# Patient Record
Sex: FEMALE
[De-identification: names, ages, dates, MRNs, and addresses within clinical notes are randomized per-mention and may not be internally consistent; named-entity substitution may affect disease eponyms.]

---

## 2022-11-07 ENCOUNTER — RX ONLY (RX ONLY)
Age: 52
End: 2022-11-07

## 2022-11-07 RX ORDER — FLUCONAZOLE 150 MG/1
TABLET ORAL
Qty: 2 | Refills: 0

## 2022-11-07 RX ORDER — FLUCONAZOLE 150 MG/1
TABLET ORAL
Qty: 2 | Refills: 0 | Status: CANCELLED
Stop reason: SDUPTHER

## 2022-11-11 ENCOUNTER — APPOINTMENT (OUTPATIENT)
Dept: URBAN - METROPOLITAN AREA CLINIC 210 | Age: 52
Setting detail: DERMATOLOGY
End: 2022-11-11

## 2022-11-11 DIAGNOSIS — Z79.899 OTHER LONG TERM (CURRENT) DRUG THERAPY: ICD-10-CM

## 2022-11-11 PROCEDURE — OTHER ORDER TESTS: OTHER

## 2022-11-11 NOTE — PROCEDURE: ORDER TESTS
Clinical Notes (To The Lab): Primary Care MD Christine Ann ordered blood work, lab order included with ICD 10 codes
Billing Type: Third-Party Bill
Expected Date Of Service: 11/11/2022
Bill For Surgical Tray: no
Performing Laboratory: -[id number]
Lab Facility: 0

## 2022-11-11 NOTE — PROCEDURE: ORDER TESTS
Bill For Surgical Tray: no
Expected Date Of Service: 11/11/2022
Clinical Notes (To The Lab): Ordered by Primary Care MD Dr Christine Ann, lab orders included ICD-10 codes, E03.9, R73.03, E78.2
Billing Type: Third-Party Bill
Performing Laboratory: -[id number]

## 2022-11-21 ENCOUNTER — RX ONLY (RX ONLY)
Age: 52
End: 2022-11-21

## 2022-11-21 RX ORDER — DICLOFENAC SODIUM 30 MG/G
GEL TOPICAL
Qty: 100 | Refills: 1 | Status: CANCELLED

## 2022-11-21 RX ORDER — DICLOFENAC SODIUM 30 MG/G
GEL TOPICAL
Qty: 100 | Refills: 1 | Status: ERX

## 2022-11-21 RX ORDER — PREDNISONE 10 MG/1
TABLET ORAL
Qty: 15 | Refills: 0 | Status: ERX

## 2023-01-25 ENCOUNTER — APPOINTMENT (OUTPATIENT)
Dept: URBAN - METROPOLITAN AREA CLINIC 210 | Age: 53
Setting detail: DERMATOLOGY
End: 2023-01-30

## 2023-01-25 DIAGNOSIS — L98419 CHRONIC ULCER OF OTHER SPECIFIED SITES: ICD-10-CM

## 2023-01-25 DIAGNOSIS — L98429 CHRONIC ULCER OF OTHER SPECIFIED SITES: ICD-10-CM

## 2023-01-25 PROBLEM — L98.411 NON-PRESSURE CHRONIC ULCER OF BUTTOCK LIMITED TO BREAKDOWN OF SKIN: Status: ACTIVE | Noted: 2023-01-25

## 2023-01-25 PROCEDURE — OTHER ORDER TESTS: OTHER

## 2023-01-25 ASSESSMENT — LOCATION ZONE DERM: LOCATION ZONE: TRUNK

## 2023-01-25 ASSESSMENT — LOCATION DETAILED DESCRIPTION DERM: LOCATION DETAILED: LEFT BUTTOCK

## 2023-01-25 ASSESSMENT — LOCATION SIMPLE DESCRIPTION DERM: LOCATION SIMPLE: LEFT BUTTOCK

## 2023-01-25 NOTE — PROCEDURE: ORDER TESTS
Performing Laboratory: -[id number]
Expected Date Of Service: 01/25/2023
Billing Type: Third-Party Bill
Bill For Surgical Tray: no

## 2023-01-30 ENCOUNTER — RX ONLY (RX ONLY)
Age: 53
End: 2023-01-30

## 2023-01-30 RX ORDER — CIPROFLOXACIN 500 MG/1
TABLET, FILM COATED ORAL
Qty: 28 | Refills: 0 | Status: ERX | COMMUNITY
Start: 2023-01-30

## 2023-02-22 ENCOUNTER — RX ONLY (RX ONLY)
Age: 53
End: 2023-02-22

## 2023-02-22 RX ORDER — SULFAMETHOXAZOLE AND TRIMETHOPRIM 800; 160 MG/1; MG/1
TABLET ORAL
Qty: 20 | Refills: 0 | Status: ERX | COMMUNITY
Start: 2023-02-22

## 2023-03-29 ENCOUNTER — APPOINTMENT (OUTPATIENT)
Dept: URBAN - METROPOLITAN AREA CLINIC 210 | Age: 53
Setting detail: DERMATOLOGY
End: 2023-04-02

## 2023-03-29 DIAGNOSIS — D485 NEOPLASM OF UNCERTAIN BEHAVIOR OF SKIN: ICD-10-CM

## 2023-03-29 PROBLEM — D48.5 NEOPLASM OF UNCERTAIN BEHAVIOR OF SKIN: Status: ACTIVE | Noted: 2023-03-29

## 2023-03-29 PROCEDURE — OTHER NO CHARGE VISIT: OTHER

## 2023-03-29 PROCEDURE — OTHER EXCISION: OTHER

## 2023-03-29 ASSESSMENT — LOCATION DETAILED DESCRIPTION DERM: LOCATION DETAILED: LEFT BUTTOCK

## 2023-03-29 ASSESSMENT — LOCATION SIMPLE DESCRIPTION DERM: LOCATION SIMPLE: LEFT BUTTOCK

## 2023-03-29 ASSESSMENT — LOCATION ZONE DERM: LOCATION ZONE: TRUNK

## 2023-03-29 NOTE — PROCEDURE: EXCISION
Path Notes (To The Dermatopathologist): Please check margins.\\nPatient is an employee please discount charges, thank you

## 2023-03-29 NOTE — PROCEDURE: EXCISION
(4) excellent Mastoid Interpolation Flap Text: A decision was made to reconstruct the defect utilizing an interpolation axial flap and a staged reconstruction.  A telfa template was made of the defect.  This telfa template was then used to outline the mastoid interpolation flap.  The donor area for the pedicle flap was then injected with anesthesia.  The flap was excised through the skin and subcutaneous tissue down to the layer of the underlying musculature.  The pedicle flap was carefully excised within this deep plane to maintain its blood supply.  The edges of the donor site were undermined.   The donor site was closed in a primary fashion.  The pedicle was then rotated into position and sutured.  Once the tube was sutured into place, adequate blood supply was confirmed with blanching and refill.  The pedicle was then wrapped with xeroform gauze and dressed appropriately with a telfa and gauze bandage to ensure continued blood supply and protect the attached pedicle.

## 2023-04-05 ENCOUNTER — APPOINTMENT (OUTPATIENT)
Dept: URBAN - METROPOLITAN AREA CLINIC 210 | Age: 53
Setting detail: DERMATOLOGY
End: 2023-04-09

## 2023-04-05 DIAGNOSIS — Z48.817 ENCOUNTER FOR SURGICAL AFTERCARE FOLLOWING SURGERY ON THE SKIN AND SUBCUTANEOUS TISSUE: ICD-10-CM

## 2023-04-05 PROCEDURE — OTHER NO CHARGE VISIT: OTHER

## 2023-04-05 PROCEDURE — OTHER POST-OP WOUND CHECK (NO GLOBAL PERIOD): OTHER

## 2023-04-05 ASSESSMENT — LOCATION ZONE DERM: LOCATION ZONE: TRUNK

## 2023-04-05 ASSESSMENT — LOCATION DETAILED DESCRIPTION DERM: LOCATION DETAILED: LEFT BUTTOCK

## 2023-04-05 ASSESSMENT — LOCATION SIMPLE DESCRIPTION DERM: LOCATION SIMPLE: LEFT BUTTOCK

## 2023-04-06 ENCOUNTER — APPOINTMENT (OUTPATIENT)
Dept: URBAN - METROPOLITAN AREA CLINIC 210 | Age: 53
Setting detail: DERMATOLOGY
End: 2023-04-09

## 2023-04-06 DIAGNOSIS — Z48.817 ENCOUNTER FOR SURGICAL AFTERCARE FOLLOWING SURGERY ON THE SKIN AND SUBCUTANEOUS TISSUE: ICD-10-CM

## 2023-04-06 PROCEDURE — OTHER NO CHARGE VISIT: OTHER

## 2023-04-06 PROCEDURE — OTHER DRESSING CHANGE: OTHER

## 2023-04-06 ASSESSMENT — LOCATION SIMPLE DESCRIPTION DERM: LOCATION SIMPLE: LEFT BUTTOCK

## 2023-04-06 ASSESSMENT — LOCATION DETAILED DESCRIPTION DERM: LOCATION DETAILED: LEFT BUTTOCK

## 2023-04-06 ASSESSMENT — LOCATION ZONE DERM: LOCATION ZONE: TRUNK

## 2023-04-06 NOTE — PROCEDURE: DRESSING CHANGE
Detail Level: Detailed
Add 88085 Cpt? (Important Note: In 2017 The Use Of 82301 Is Being Tracked By Cms To Determine Future Global Period Reimbursement For Global Periods): no

## 2023-04-10 ENCOUNTER — APPOINTMENT (OUTPATIENT)
Dept: URBAN - METROPOLITAN AREA CLINIC 210 | Age: 53
Setting detail: DERMATOLOGY
End: 2023-04-10

## 2023-04-10 DIAGNOSIS — L0390 CELLULITIS AND ABSCESS OF UNSPECIFIED SITES: ICD-10-CM

## 2023-04-10 DIAGNOSIS — L0391 CELLULITIS AND ABSCESS OF UNSPECIFIED SITES: ICD-10-CM

## 2023-04-10 PROBLEM — L02.31 CUTANEOUS ABSCESS OF BUTTOCK: Status: ACTIVE | Noted: 2023-04-10

## 2023-04-10 PROCEDURE — OTHER INCISION AND DRAINAGE: OTHER

## 2023-04-10 PROCEDURE — OTHER PRESCRIPTION MEDICATION MANAGEMENT: OTHER

## 2023-04-10 PROCEDURE — OTHER NO CHARGE VISIT: OTHER

## 2023-04-10 PROCEDURE — OTHER ORDER TESTS: OTHER

## 2023-04-10 ASSESSMENT — LOCATION ZONE DERM: LOCATION ZONE: TRUNK

## 2023-04-10 ASSESSMENT — LOCATION DETAILED DESCRIPTION DERM: LOCATION DETAILED: LEFT BUTTOCK

## 2023-04-10 ASSESSMENT — LOCATION SIMPLE DESCRIPTION DERM: LOCATION SIMPLE: LEFT BUTTOCK

## 2023-04-10 NOTE — PROCEDURE: ORDER TESTS
Bill For Surgical Tray: no
Lab Facility: 0
Performing Laboratory: -[id number]
Expected Date Of Service: 04/10/2023
Billing Type: Third-Party Bill

## 2023-04-10 NOTE — PROCEDURE: INCISION AND DRAINAGE
Detail Level: Detailed
Lesion Type: Abscess
Method: 2 mm punch
Curette: No
Anesthesia Type: 1% lidocaine with epinephrine
Packing?: plain packing strips
Size Of Lesion In Cm (Optional But May Be Required For Some Insurances): 0
Drainage Amount?: minimal
Wound Care: gentamicin 0.1% ointment
Dressing: dry sterile dressing
Epidermal Sutures: 4-0 Ethilon
Epidermal Closure: simple interrupted
Suture Text: No sutures we?re used during procedure.
Preparation Text: The area was prepped in the usual clean fashion.
Curette Text (Optional): After the contents were expressed a curette was used to partially remove the cyst wall.
Consent was obtained and risks were reviewed including but not limited to delayed wound healing, infection, need for multiple I and D's, and pain.
Post-Care Instructions: I reviewed with the patient in detail post-care instructions. Patient should keep wound covered and call the office should any redness, pain, swelling or worsening occur.

## 2023-04-10 NOTE — PROCEDURE: PRESCRIPTION MEDICATION MANAGEMENT
Render In Strict Bullet Format?: No
Plan: Area was flushed by peroxide. Water irrigated after. Gentamicin started today and to be continued once daily. Patient will return tomorrow for bandage change, bacterial culture done today to R/O infection. Patient to finish keflex as prescribed.
Detail Level: Zone

## 2023-04-11 ENCOUNTER — APPOINTMENT (OUTPATIENT)
Dept: URBAN - METROPOLITAN AREA CLINIC 210 | Age: 53
Setting detail: DERMATOLOGY
End: 2023-04-11

## 2023-04-11 ENCOUNTER — APPOINTMENT (OUTPATIENT)
Dept: URBAN - METROPOLITAN AREA CLINIC 210 | Age: 53
Setting detail: DERMATOLOGY
End: 2023-04-16

## 2023-04-11 DIAGNOSIS — Z48.817 ENCOUNTER FOR SURGICAL AFTERCARE FOLLOWING SURGERY ON THE SKIN AND SUBCUTANEOUS TISSUE: ICD-10-CM

## 2023-04-11 PROCEDURE — OTHER NO CHARGE VISIT: OTHER

## 2023-04-11 PROCEDURE — OTHER DRESSING CHANGE: OTHER

## 2023-04-11 ASSESSMENT — LOCATION SIMPLE DESCRIPTION DERM: LOCATION SIMPLE: RIGHT BUTTOCK

## 2023-04-11 ASSESSMENT — LOCATION DETAILED DESCRIPTION DERM: LOCATION DETAILED: RIGHT MEDIAL BUTTOCK

## 2023-04-11 ASSESSMENT — LOCATION ZONE DERM: LOCATION ZONE: TRUNK

## 2023-04-11 NOTE — PROCEDURE: DRESSING CHANGE
Detail Level: Detailed
Add 80019 Cpt? (Important Note: In 2017 The Use Of 85804 Is Being Tracked By Cms To Determine Future Global Period Reimbursement For Global Periods): no

## 2023-04-12 ENCOUNTER — APPOINTMENT (OUTPATIENT)
Dept: URBAN - METROPOLITAN AREA CLINIC 210 | Age: 53
Setting detail: DERMATOLOGY
End: 2023-04-16

## 2023-04-12 DIAGNOSIS — Z48.817 ENCOUNTER FOR SURGICAL AFTERCARE FOLLOWING SURGERY ON THE SKIN AND SUBCUTANEOUS TISSUE: ICD-10-CM

## 2023-04-12 PROCEDURE — OTHER NO CHARGE VISIT: OTHER

## 2023-04-12 PROCEDURE — OTHER DRESSING CHANGE: OTHER

## 2023-04-12 ASSESSMENT — LOCATION ZONE DERM: LOCATION ZONE: TRUNK

## 2023-04-12 ASSESSMENT — LOCATION DETAILED DESCRIPTION DERM: LOCATION DETAILED: LEFT BUTTOCK

## 2023-04-12 ASSESSMENT — LOCATION SIMPLE DESCRIPTION DERM: LOCATION SIMPLE: LEFT BUTTOCK

## 2023-04-12 NOTE — PROCEDURE: DRESSING CHANGE
Detail Level: Detailed
Wound Evaluated By: Dr. Zambrano
Add 46701 Cpt? (Important Note: In 2017 The Use Of 57609 Is Being Tracked By Cms To Determine Future Global Period Reimbursement For Global Periods): no

## 2023-04-17 ENCOUNTER — APPOINTMENT (OUTPATIENT)
Dept: URBAN - METROPOLITAN AREA CLINIC 210 | Age: 53
Setting detail: DERMATOLOGY
End: 2023-04-28

## 2023-04-17 DIAGNOSIS — Z48.817 ENCOUNTER FOR SURGICAL AFTERCARE FOLLOWING SURGERY ON THE SKIN AND SUBCUTANEOUS TISSUE: ICD-10-CM

## 2023-04-17 DIAGNOSIS — Z48.02 ENCOUNTER FOR REMOVAL OF SUTURES: ICD-10-CM

## 2023-04-17 PROCEDURE — OTHER ADDITIONAL NOTES: OTHER

## 2023-04-17 PROCEDURE — OTHER DRESSING CHANGE: OTHER

## 2023-04-17 PROCEDURE — 99212 OFFICE O/P EST SF 10 MIN: CPT

## 2023-04-17 PROCEDURE — OTHER SUTURE REMOVAL: OTHER

## 2023-04-17 ASSESSMENT — LOCATION DETAILED DESCRIPTION DERM: LOCATION DETAILED: LEFT BUTTOCK

## 2023-04-17 ASSESSMENT — LOCATION SIMPLE DESCRIPTION DERM: LOCATION SIMPLE: LEFT BUTTOCK

## 2023-04-17 ASSESSMENT — LOCATION ZONE DERM: LOCATION ZONE: TRUNK

## 2023-04-17 NOTE — PROCEDURE: ADDITIONAL NOTES
Detail Level: Simple
Additional Notes: Pt instructed not to sit as much as possible and to remain out of work until next follow up visit.
Render Risk Assessment In Note?: no

## 2023-04-17 NOTE — PROCEDURE: DRESSING CHANGE
Detail Level: Detailed
Add 40925 Cpt? (Important Note: In 2017 The Use Of 75302 Is Being Tracked By Cms To Determine Future Global Period Reimbursement For Global Periods): no

## 2023-04-18 ENCOUNTER — APPOINTMENT (OUTPATIENT)
Dept: URBAN - METROPOLITAN AREA CLINIC 210 | Age: 53
Setting detail: DERMATOLOGY
End: 2023-04-28

## 2023-04-18 DIAGNOSIS — Z48.817 ENCOUNTER FOR SURGICAL AFTERCARE FOLLOWING SURGERY ON THE SKIN AND SUBCUTANEOUS TISSUE: ICD-10-CM

## 2023-04-18 PROCEDURE — 99212 OFFICE O/P EST SF 10 MIN: CPT

## 2023-04-18 PROCEDURE — OTHER PRESCRIPTION: OTHER

## 2023-04-18 PROCEDURE — OTHER DRESSING CHANGE: OTHER

## 2023-04-18 PROCEDURE — OTHER COUNSELING: OTHER

## 2023-04-18 RX ORDER — LEVOFLOXACIN 750 MG/1
TABLET ORAL
Qty: 7 | Refills: 0 | Status: ERX | COMMUNITY
Start: 2023-04-18

## 2023-04-18 ASSESSMENT — LOCATION DETAILED DESCRIPTION DERM: LOCATION DETAILED: LEFT BUTTOCK

## 2023-04-18 ASSESSMENT — LOCATION SIMPLE DESCRIPTION DERM: LOCATION SIMPLE: LEFT BUTTOCK

## 2023-04-18 ASSESSMENT — LOCATION ZONE DERM: LOCATION ZONE: TRUNK

## 2023-04-18 NOTE — PROCEDURE: DRESSING CHANGE
Detail Level: Detailed
Wound Evaluated By: Tamanna STORY
Add 51770 Cpt? (Important Note: In 2017 The Use Of 29025 Is Being Tracked By Cms To Determine Future Global Period Reimbursement For Global Periods): no

## 2023-04-24 ENCOUNTER — APPOINTMENT (OUTPATIENT)
Dept: URBAN - METROPOLITAN AREA CLINIC 210 | Age: 53
Setting detail: DERMATOLOGY
End: 2023-04-28

## 2023-04-24 DIAGNOSIS — Z48.817 ENCOUNTER FOR SURGICAL AFTERCARE FOLLOWING SURGERY ON THE SKIN AND SUBCUTANEOUS TISSUE: ICD-10-CM

## 2023-04-24 PROCEDURE — OTHER MIPS QUALITY: OTHER

## 2023-04-24 PROCEDURE — OTHER COUNSELING: OTHER

## 2023-04-24 PROCEDURE — OTHER DRESSING CHANGE: OTHER

## 2023-04-24 PROCEDURE — OTHER PRESCRIPTION: OTHER

## 2023-04-24 PROCEDURE — 99212 OFFICE O/P EST SF 10 MIN: CPT

## 2023-04-24 RX ORDER — FLUCONAZOLE 150 MG/1
TABLET ORAL
Qty: 7 | Refills: 0 | Status: ERX | COMMUNITY
Start: 2023-04-24

## 2023-04-24 ASSESSMENT — LOCATION SIMPLE DESCRIPTION DERM: LOCATION SIMPLE: LEFT BUTTOCK

## 2023-04-24 ASSESSMENT — LOCATION DETAILED DESCRIPTION DERM: LOCATION DETAILED: LEFT BUTTOCK

## 2023-04-24 ASSESSMENT — LOCATION ZONE DERM: LOCATION ZONE: TRUNK

## 2023-04-24 NOTE — PROCEDURE: MIPS QUALITY
Name And Contact Information For Health Care Proxy: Hero Belle [phone] Name And Contact Information For Health Care Proxy: Hero Belle [phone]

## 2023-04-24 NOTE — PROCEDURE: DRESSING CHANGE
Detail Level: Detailed
Add 90484 Cpt? (Important Note: In 2017 The Use Of 00527 Is Being Tracked By Cms To Determine Future Global Period Reimbursement For Global Periods): no

## 2023-05-24 ENCOUNTER — RX ONLY (RX ONLY)
Age: 53
End: 2023-05-24

## 2023-05-24 RX ORDER — FAMCICLOVIR 500 MG/1
TABLET, FILM COATED ORAL
Qty: 30 | Refills: 6 | Status: ERX | COMMUNITY
Start: 2023-05-24

## 2023-11-29 ENCOUNTER — APPOINTMENT (OUTPATIENT)
Dept: URBAN - METROPOLITAN AREA CLINIC 210 | Age: 53
Setting detail: DERMATOLOGY
End: 2023-11-29

## 2023-11-29 DIAGNOSIS — L08.9 LOCAL INFECTION OF THE SKIN AND SUBCUTANEOUS TISSUE, UNSPECIFIED: ICD-10-CM

## 2023-11-29 PROCEDURE — OTHER ORDER TESTS: OTHER

## 2023-11-29 ASSESSMENT — LOCATION DETAILED DESCRIPTION DERM: LOCATION DETAILED: LEFT BUTTOCK

## 2023-11-29 ASSESSMENT — LOCATION ZONE DERM: LOCATION ZONE: TRUNK

## 2023-11-29 ASSESSMENT — LOCATION SIMPLE DESCRIPTION DERM: LOCATION SIMPLE: LEFT BUTTOCK

## 2023-11-29 NOTE — PROCEDURE: ORDER TESTS
Clinical Notes (To The Lab): Location: left buttock
Performing Laboratory: -[id number]
Billing Type: Third-Party Bill
Expected Date Of Service: 11/29/2023
Bill For Surgical Tray: no

## 2023-11-29 NOTE — PROCEDURE: ORDER TESTS
Performing Laboratory: -[id number]
Billing Type: Third-Party Bill
Clinical Notes (To The Lab): Location: left buttock
Bill For Surgical Tray: no
Expected Date Of Service: 11/29/2023

## 2023-12-05 ENCOUNTER — RX ONLY (RX ONLY)
Age: 53
End: 2023-12-05

## 2023-12-05 RX ORDER — CIPROFLOXACIN 500 MG/1
TABLET, FILM COATED ORAL
Qty: 28 | Refills: 0 | Status: ERX

## 2024-02-12 ENCOUNTER — APPOINTMENT (OUTPATIENT)
Dept: URBAN - METROPOLITAN AREA CLINIC 210 | Age: 54
Setting detail: DERMATOLOGY
End: 2024-02-14

## 2024-02-12 ENCOUNTER — APPOINTMENT (OUTPATIENT)
Dept: URBAN - METROPOLITAN AREA CLINIC 210 | Age: 54
Setting detail: DERMATOLOGY
End: 2024-02-12

## 2024-02-12 DIAGNOSIS — L08.9 LOCAL INFECTION OF THE SKIN AND SUBCUTANEOUS TISSUE, UNSPECIFIED: ICD-10-CM

## 2024-02-12 PROCEDURE — OTHER NO CHARGE VISIT: OTHER

## 2024-02-12 PROCEDURE — OTHER ORDER TESTS: OTHER

## 2024-02-12 ASSESSMENT — LOCATION ZONE DERM: LOCATION ZONE: TRUNK

## 2024-02-12 ASSESSMENT — LOCATION DETAILED DESCRIPTION DERM: LOCATION DETAILED: LEFT BUTTOCK

## 2024-02-12 ASSESSMENT — LOCATION SIMPLE DESCRIPTION DERM: LOCATION SIMPLE: LEFT BUTTOCK

## 2024-02-12 NOTE — PROCEDURE: ORDER TESTS
Billing Type: Third-Party Bill
Expected Date Of Service: 02/12/2024
Performing Laboratory: -[id number]
Lab Facility: 0
Bill For Surgical Tray: no
Clinical Notes (To The Lab): Location: left buttock

## 2024-02-19 ENCOUNTER — RX ONLY (RX ONLY)
Age: 54
End: 2024-02-19

## 2024-02-19 RX ORDER — AMOXICILLIN 875 MG/1
TABLET, FILM COATED ORAL
Qty: 28 | Refills: 0 | Status: CANCELLED
Stop reason: CLARIF

## 2024-02-19 RX ORDER — DOXYCYCLINE HYCLATE 100 MG/1
CAPSULE, GELATIN COATED ORAL
Qty: 30 | Refills: 0 | Status: ERX | COMMUNITY
Start: 2024-02-19

## 2024-02-19 RX ORDER — AMOXICILLIN AND CLAVULANATE POTASSIUM 875; 125 MG/1; MG/1
TABLET, FILM COATED ORAL
Qty: 28 | Refills: 0 | Status: ERX | COMMUNITY
Start: 2024-02-19

## 2024-02-21 ENCOUNTER — APPOINTMENT (OUTPATIENT)
Dept: URBAN - METROPOLITAN AREA CLINIC 210 | Age: 54
Setting detail: DERMATOLOGY
End: 2024-02-27

## 2024-02-21 DIAGNOSIS — L0390 CELLULITIS AND ABSCESS OF UNSPECIFIED SITES: ICD-10-CM

## 2024-02-21 DIAGNOSIS — L0391 CELLULITIS AND ABSCESS OF UNSPECIFIED SITES: ICD-10-CM

## 2024-02-21 PROBLEM — L02.31 CUTANEOUS ABSCESS OF BUTTOCK: Status: ACTIVE | Noted: 2024-02-21

## 2024-02-21 PROCEDURE — OTHER INCISION AND DRAINAGE: OTHER

## 2024-02-21 PROCEDURE — OTHER ORDER TESTS: OTHER

## 2024-02-21 PROCEDURE — OTHER COUNSELING: OTHER

## 2024-02-21 PROCEDURE — OTHER NO CHARGE VISIT: OTHER

## 2024-02-21 PROCEDURE — OTHER PRESCRIPTION MEDICATION MANAGEMENT: OTHER

## 2024-02-21 ASSESSMENT — LOCATION SIMPLE DESCRIPTION DERM: LOCATION SIMPLE: LEFT BUTTOCK

## 2024-02-21 ASSESSMENT — LOCATION ZONE DERM: LOCATION ZONE: TRUNK

## 2024-02-21 ASSESSMENT — LOCATION DETAILED DESCRIPTION DERM: LOCATION DETAILED: LEFT BUTTOCK

## 2024-02-21 NOTE — PROCEDURE: INCISION AND DRAINAGE
Detail Level: Detailed
Lesion Type: Abscess
Method: 11 blade
Curette: No
Anesthesia Type: 1% lidocaine with epinephrine
Packing?: iodoform packing strips
Size Of Lesion In Cm (Optional But May Be Required For Some Insurances): 0
Drainage Amount?: moderate
Drainage Type?: purulent
Wound Care: gentamicin 0.1% ointment
Dressing: waterproof dressing
Epidermal Sutures: 4-0 Ethilon
Epidermal Closure: simple interrupted
Suture Text: The incision was partially closed with
Preparation Text: The area was prepped in the usual clean fashion.
Curette Text (Optional): After the contents were expressed a curette was used to partially remove the cyst wall.
Consent was obtained and risks were reviewed including but not limited to delayed wound healing, infection, need for multiple I and D's, and pain.
Post-Care Instructions: I reviewed with the patient in detail post-care instructions. Iodoform wick applied today with Gentamicin ointment. Will advance on Friday.  Patient understands to keep wound covered and call the office should any redness, pain, swelling or worsening occur.

## 2024-02-21 NOTE — PROCEDURE: PRESCRIPTION MEDICATION MANAGEMENT
Detail Level: Zone
Plan: Iodoform wick applied today with Gentamicin ointment. Will advance on Friday.
Continue Regimen: Doxycycline treatment
Render In Strict Bullet Format?: No

## 2024-02-21 NOTE — PROCEDURE: ORDER TESTS
Lab Facility: 0
Expected Date Of Service: 02/21/2024
Bill For Surgical Tray: no
Billing Type: Third-Party Bill
Performing Laboratory: -[id number]

## 2024-02-23 ENCOUNTER — APPOINTMENT (OUTPATIENT)
Dept: URBAN - METROPOLITAN AREA CLINIC 210 | Age: 54
Setting detail: DERMATOLOGY
End: 2024-02-27

## 2024-02-23 DIAGNOSIS — L0391 CELLULITIS AND ABSCESS OF UNSPECIFIED SITES: ICD-10-CM

## 2024-02-23 DIAGNOSIS — L0390 CELLULITIS AND ABSCESS OF UNSPECIFIED SITES: ICD-10-CM

## 2024-02-23 PROBLEM — L02.31 CUTANEOUS ABSCESS OF BUTTOCK: Status: ACTIVE | Noted: 2024-02-23

## 2024-02-23 PROCEDURE — OTHER COUNSELING: OTHER

## 2024-02-23 PROCEDURE — OTHER PRESCRIPTION MEDICATION MANAGEMENT: OTHER

## 2024-02-23 PROCEDURE — OTHER NO CHARGE VISIT: OTHER

## 2024-02-23 ASSESSMENT — LOCATION ZONE DERM: LOCATION ZONE: TRUNK

## 2024-02-23 ASSESSMENT — LOCATION DETAILED DESCRIPTION DERM: LOCATION DETAILED: LEFT BUTTOCK

## 2024-02-23 ASSESSMENT — LOCATION SIMPLE DESCRIPTION DERM: LOCATION SIMPLE: LEFT BUTTOCK

## 2024-02-23 NOTE — PROCEDURE: PRESCRIPTION MEDICATION MANAGEMENT
Detail Level: Zone
Plan: Iodoform wick advanced today with Gentamicin ointment.
Continue Regimen: Doxycycline treatment
Render In Strict Bullet Format?: No

## 2024-03-07 ENCOUNTER — RX ONLY (RX ONLY)
Age: 54
End: 2024-03-07

## 2024-03-07 RX ORDER — FLUCONAZOLE 150 MG/1
TABLET ORAL
Qty: 2 | Refills: 0 | Status: ERX | COMMUNITY
Start: 2024-03-07

## 2025-05-12 ENCOUNTER — RX ONLY (RX ONLY)
Age: 55
End: 2025-05-12

## 2025-05-12 RX ORDER — FAMCICLOVIR 500 MG/1
TABLET, FILM COATED ORAL
Qty: 30 | Refills: 6 | Status: ERX

## 2025-05-30 ENCOUNTER — APPOINTMENT (OUTPATIENT)
Dept: URBAN - METROPOLITAN AREA CLINIC 210 | Age: 55
Setting detail: DERMATOLOGY
End: 2025-05-30

## 2025-05-30 DIAGNOSIS — Z41.9 ENCOUNTER FOR PROCEDURE FOR PURPOSES OTHER THAN REMEDYING HEALTH STATE, UNSPECIFIED: ICD-10-CM

## 2025-05-30 PROCEDURE — OTHER DYSPORT (U OR CC): OTHER

## 2025-05-30 PROCEDURE — OTHER MIPS QUALITY: OTHER

## 2025-05-30 ASSESSMENT — LOCATION DETAILED DESCRIPTION DERM
LOCATION DETAILED: RIGHT INFERIOR MEDIAL FOREHEAD
LOCATION DETAILED: RIGHT MEDIAL INFERIOR EYELID
LOCATION DETAILED: LEFT SUPERIOR MEDIAL MALAR CHEEK
LOCATION DETAILED: LEFT INFERIOR MEDIAL FOREHEAD
LOCATION DETAILED: GLABELLA

## 2025-05-30 ASSESSMENT — LOCATION SIMPLE DESCRIPTION DERM
LOCATION SIMPLE: LEFT FOREHEAD
LOCATION SIMPLE: RIGHT INFERIOR EYELID
LOCATION SIMPLE: RIGHT FOREHEAD
LOCATION SIMPLE: GLABELLA
LOCATION SIMPLE: LEFT CHEEK

## 2025-05-30 ASSESSMENT — LOCATION ZONE DERM
LOCATION ZONE: FACE
LOCATION ZONE: EYELID